# Patient Record
Sex: FEMALE | Race: WHITE | NOT HISPANIC OR LATINO | Employment: FULL TIME | URBAN - METROPOLITAN AREA
[De-identification: names, ages, dates, MRNs, and addresses within clinical notes are randomized per-mention and may not be internally consistent; named-entity substitution may affect disease eponyms.]

---

## 2024-04-12 ENCOUNTER — TELEPHONE (OUTPATIENT)
Dept: DERMATOLOGY | Facility: CLINIC | Age: 64
End: 2024-04-12

## 2024-04-25 ENCOUNTER — TELEPHONE (OUTPATIENT)
Dept: DERMATOLOGY | Facility: CLINIC | Age: 64
End: 2024-04-25

## 2024-05-14 ENCOUNTER — APPOINTMENT (OUTPATIENT)
Dept: DERMATOLOGY | Facility: CLINIC | Age: 64
End: 2024-05-14

## 2024-06-04 ENCOUNTER — APPOINTMENT (OUTPATIENT)
Dept: DERMATOLOGY | Facility: CLINIC | Age: 64
End: 2024-06-04

## 2024-06-06 ENCOUNTER — APPOINTMENT (OUTPATIENT)
Dept: DERMATOLOGY | Facility: CLINIC | Age: 64
End: 2024-06-06

## 2024-06-24 ENCOUNTER — APPOINTMENT (OUTPATIENT)
Dept: DERMATOLOGY | Facility: CLINIC | Age: 64
End: 2024-06-24

## 2024-06-24 DIAGNOSIS — L90.0 LICHEN SCLEROSUS ET ATROPHICUS: Primary | ICD-10-CM

## 2024-06-24 DIAGNOSIS — L30.4 INTERTRIGO: ICD-10-CM

## 2024-06-24 PROCEDURE — 99213 OFFICE O/P EST LOW 20 MIN: CPT

## 2024-06-24 RX ORDER — ALBUTEROL SULFATE 90 UG/1
2 AEROSOL, METERED RESPIRATORY (INHALATION) EVERY 6 HOURS PRN
COMMUNITY

## 2024-06-24 RX ORDER — HYDROCORTISONE AND IODOCHLORHYDROXYQUIN 5; 30 MG/G; MG/G
1 CREAM TOPICAL 2 TIMES DAILY
Qty: 80 G | Refills: 3 | Status: SHIPPED | OUTPATIENT
Start: 2024-06-24

## 2024-06-24 RX ORDER — HALOBETASOL PROPIONATE 0.5 MG/G
CREAM TOPICAL 2 TIMES DAILY
Qty: 100 G | Refills: 2 | Status: SHIPPED | OUTPATIENT
Start: 2024-06-24

## 2024-06-24 RX ORDER — METFORMIN HYDROCHLORIDE 1000 MG/1
1000 TABLET ORAL
COMMUNITY

## 2024-06-24 RX ORDER — AMLODIPINE BESYLATE 5 MG/1
TABLET ORAL DAILY
COMMUNITY

## 2024-06-24 RX ORDER — GLIMEPIRIDE 2 MG/1
2 TABLET ORAL
COMMUNITY

## 2024-06-24 RX ORDER — CETIRIZINE HYDROCHLORIDE 10 MG/1
10 TABLET ORAL DAILY
COMMUNITY

## 2024-06-24 RX ORDER — HALOBETASOL PROPIONATE 0.5 MG/G
CREAM TOPICAL 2 TIMES DAILY
COMMUNITY

## 2024-06-24 RX ORDER — MONTELUKAST SODIUM 10 MG/1
10 TABLET ORAL NIGHTLY
COMMUNITY

## 2024-06-24 NOTE — PROGRESS NOTES
Subjective   HPI: Nona Shah is a 64 y.o. female who presents in office for evaluation and treatment of genital lichen sclerosis. LOV w/ Dr. Drew 5/22.    Genital lichen sclerosis  Dx by punch bx   Halobetasol and Ala Lance keep it in check  Been out for a couple of months  Flaring pretty bad  Extremely pruritic    History based on some notes reviewed:  -Initially seen 5/4/18 by Dr. Drew - per note was being evaluated for a perisistent burning dermatitis in vaginal and rectal areas. She had been tx for chronic vaginal candidiasis bc of her diabetes. Previous tx includes diflucan and nystatin cream. At the visit on 5/4/18 Dr. Drew believed this to be LSEA.  -Bx 5/4/2018 [id number] - lichen sclerosus  -5/11/2018 given clobetasol bid and Sernivo spray  -7/18/2019 given halobetasol bid and IM Kenalog 60 mg, Dr. Drew recommended another bx but pt declined  -8/26/2019 IM kenalog, Dr. Drew discussed 2% testosterone cream however patient preferred to continue halobetasol cream  -7/21/20 saw Dr. Drew for burning of inner thighs and buttocks, he recommended another bx however patient refused, given AlaQuin BID  -5/12/22 given opzelora bid      ROS: No other skin or systemic complaints other than what is documented elsewhere in the note.    ALLERGIES: Patient has no known allergies.    SOCIAL:  has no history on file for tobacco use, alcohol use, and drug use.    Objective   Pubic  Diffuse erythema of the groin mons pubis, bilateral labia minora and majora, bilateral inner thighs and extending back towards the buttocks    Left Inguinal Area, Right Inguinal Area  Erythema and maceration present in bilateral inguinal folds        Assessment/Plan   1. Lichen sclerosus et atrophicus  Pubic    Refill halobetasol applied BID for up to 14 days at a time. Refill Ala-Scarlett applied BID.    Related Medications  clioquinol-hydrocortisone (Ala-Scarlett) 3-0.5 % cream  Apply 1 Application topically 2 times a  day.    halobetasol (UltraVATE) 0.05 % cream  Apply topically 2 times a day.    2. Intertrigo  Left Inguinal Area; Right Inguinal Area    Discussed the importance of keeping this area clean and dry. Patient would like a refill on Ala-Scarlett.    Related Medications  clioquinol-hydrocortisone (Ala-Scarlett) 3-0.5 % cream  Apply 1 Application topically 2 times a day.         FOLLOW UP: 6-8 weeks    The patient was encouraged to contact me with any further questions or concerns.  Jolie Ruiz PA-C  7/3/2024